# Patient Record
(demographics unavailable — no encounter records)

---

## 2025-01-27 NOTE — HISTORY OF PRESENT ILLNESS
[FreeTextEntry8] : WAS ADMITTED TO [facility] 1/5/2025 TO 1/18/2025 - RSV AND UTI h/o kidney transplant 2011 PCP IN [place] DR PHILAP CHAIN   H/O HTN, HLD, DM, KIDNEY TRANSPLANT  PARKING PERMIT APPLICATION

## 2025-04-08 NOTE — HISTORY OF PRESENT ILLNESS
[FreeTextEntry1] : Ms. Sahni, here with her daughter for post transplant follow up. She was hospitalized with dialysis requiring JUNIOR in January 2025 and recovered now to creatinine of 1.9. She had her DDRT in [facility] and was pre transplant followed by Dr. Angel Lanza. POst transplant she lived in [place] and was followed by her transplant nephrologist Dr. Billingsley and recently moved in with her daughter and family back in [place]. Currently daughter also noted her having memory issues and is having her consult neurologist and establish with a primary care provider in [place]. I reviewed her hospital course, nephrology consult while in hospital and allograft renal biopsy. At present she feels ok, has no acute symptoms except for lingering post herpetic neuralgic pain over left side of chest wall for which she just received delivery of Neurontin 100 mg caosules to be taken twice daily. She has had recent lab work done except for Tacrolimus levels. She is notably on once daily prograf since hospitalization and will check Tacrolimus level pre dose later this week. She ambulates with Rolling walker, can walk short distances and is using wheel chair in the clinic today.   79 years old lady, retired , born in [facility], living in [place] 1962. Developed CKD from DM, was on HD for 3 years followed by Dr. Angel Lanza. received transplant in [facility] ([facility] Billingsley) November 2011. She lived in [place] till 2023. Now moved back to [place] to live with Janee and family.  Had RSV infection and JUNIOR in January she got dialysis through femoral catheter once and then recovered.  Most recent  labs done March 16, 2025: Creatinine 1.9  Hb 8.5  Currently has pain from Zoster lesions on left side of chest wall. Since february. She got antiviral treatment from [place]. Also took Lidocaine patches. Also has Gabapentin just received.  Current Meds: Vitamin D3 q weekly Mycophenolate 250 mg 2 Caps BID Prednisone 5 mg/d Rosuvastatin 40 mg/d Prograf 1 mg once daily (prior to hospitalization was 2 caps am and 1 pm) Losartan 25 mg PM Metoprolol 100 mg once daily am Donepezil 5 mg once daily Novolog premeal sliding scale Tresiba 13 u at night Gabapentin 100 mg 3 times daily  Has seen ophthalmologist. Primary MD: not yet in [place]       Hospital Course: Discharge Date 18-Jan-2025 Admission Date 05-Jan-2025 12:29 Reason for Admission Acute Renal failure, Metabolic Acidosis, Hyperkalemia Hospital Course HPI: 79F PMH ESRD (s/p renal transplant in 2011), DM, HTN presenting for weakness. Daughter reports patient began experiencing weakness in end of November, found to have UTI and treated with bactrim. Patient improved but then developed cough, rhinorrhea, and mild diarrhea in mid December, and tested positive for RSV on 12/19/24. Upon returning from [place] for the holidays in late December, patient developed poor appetite, NBNB emesis, and non-bloody watery diarrhea, as well as progressive weakness and fatigue. Patient endorses new dysuria for past few days Patient denies fevers, abdominal pain, chest pain, shortness of breath, leg swelling. (05 Jan 2025 13:15)  Hospital Course: Patient was admitted for acute renal failure, with placement of fem shiley by vascular surgery on 1/5 for urgent HD on 1/5 and 1/7. For concern for infectious etiologies, patient was placed on ceftriaxone (1/5-1/7) by transplant ID. On 1/8, patient had tunneled permacath and biopsy of transplant kidney revealing acute tubular injury but no signs for rejection. Patient's Cr and electryolytes stabilized after 1/7 HD session. Notably, tacrolimus levels were persistently elevated, which nephro thought can contribute to the kidney injury. Patient was febrile on 1/7 to 101.6 prompting expansion of coverage to cefepime (1/7-1/9) then Zosyn (1/10-1/14), for pseudomonal sputum cultures and fluconazole (1/5-1/10) for candiduria on UA. Notably, speciation resulted with candida glabrata not sensitive to fluconazole, suggesting unlikely contribution to presenting fever. 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 Pt creatinine down trending, permacath removed on 1/17, no further indication for out patient hemodialysis. Tacrolimus and mycophenolate were restarted on 1/19   Important Medication Changes and Reason: continue to take tacrolimus 1mg qd and mycophenolate 500 mg BID continue to take PO acyclovir for HSV skin infection until 1/20   Discharge Diagnoses: - acute renal failure - HSV   Discharge Medications acyclovir 400 mg oral tablet: 1 tab(s) orally 2 times a day Take 2 tabs a day on 1/19 and 1/20 cholecalciferol 1250 mcg (50,000 intl units) oral capsule: 1 cap(s) orally every 7 days on Fridays Crestor 40 mg oral tablet: 1 tab(s) orally once a day (in the morning) donepezil 5 mg oral tablet: 1 tab(s) orally once a day (at bedtime) as needed for dementia donepezil 5 mg oral tablet: 1 tab(s) orally once a day Envarsus XR 1 mg oral tablet, extended release: 1 tab(s) orally once a day famotidine 20 mg oral tablet: 1 tab(s) orally once a day losartan 25 mg oral tablet: 1 tab(s) orally once a day (at bedtime) losartan 25 mg oral tablet: 1 tab(s) orally once a day metoprolol succinate 100 mg oral tablet, extended release: 1 tab(s) orally once a day metoprolol succinate 100 mg oral tablet, extended release: 1 tab(s) orally once a day (in the morning) mycophenolate mofetil 250 mg oral capsule: 2 cap(s) orally 2 times a day mycophenolate mofetil 500 mg oral tablet: 1 tab(s) orally 2 times a day predniSONE 5 mg oral tablet: 1 tab(s) orally once a day (in the morning) predniSONE 5 mg oral tablet: 1 tab(s) orally once a day rosuvastatin 40 mg oral tablet: 1 tab(s) orally sulfamethoxazole-trimethoprim 400 mg-80 mg oral tablet: 2 tab(s) orally 2 times a day tacrolimus 0.5 mg oral capsule: 1 cap(s) orally once a day morning dose tacrolimus 1 mg oral capsule: 1 cap(s) orally once a day (at bedtime) Evening dose Tresiba 100 units/mL subcutaneous solution: 13 international unit(s) subcutaneous once (at bedtime)  , , Care Plan/Procedures: Discharge Diagnoses, Assessment and Plan of Treatment PRINCIPAL DISCHARGE DIAGNOSIS Diagnosis: Acute renal failure (ARF) Assessment and Plan of Treatment: You were treated in the hospital for an acute kidney failure or injury (JUNIOR). An JUNIOR is a sudden worsening of your kidney function that can be caused by many issues including dehydration, intrinsic problems with your kidneys, or blockages in your urinary system. Based on the renal biopsy, it shows signs of acute renal injury but no signs of rejection. This may be secondary to her elevated tacrolimus level vs her penumonia. We decreased your dose of tacrolimus to 1mg daily, please continue taking that and follow up with your kidney doctor out patient. Please also continue taking mycophenolate 500mg twice daily and prednisone 5 mg daily.   SECONDARY DISCHARGE DIAGNOSES Diagnosis: Renal mass Assessment and Plan of Treatment: On the CT of your abdomen and recent renal ultrasound, we identified a renal cyst on your native kidney. Renal cysts are fluid-filled sacs that develop within the kidney. . Most renal cysts do not cause any symptoms and do not require treatment. However, it's important to monitor them to ensure they don't change in size or appearance over time. Please get a repeat ultrasound of your kidneys in three months. This will allow us to compare the size and characteristics of the cyst to the previous ultrasound and ensure that it remains stable. Discharge Procedures, Findings and Treatment PRINCIPAL PROCEDURE Procedure: Biopsy renal Findings and Treatment:   SECONDARY PROCEDURE Procedure: CT of abdomen Findings and Treatment: < end of copied text > PROCEDURE: CT of the Abdomen and Pelvis was performed. Sagittal and coronal reformats were performed. FINDINGS: LOWER CHEST: Cardiomegaly. Coronary artery calcifications. Bibasilar atelectasis. LIVER: Within normal limits. BILE DUCTS: Normal caliber. GALLBLADDER: Cholelithiasis. SPLEEN: Within normal limits. PANCREAS: Within normal limits. ADRENALS: Within normal limits. KIDNEYS/URETERS: Moderately atrophic native kidneys. Indeterminate exophytic lesion in the right interpolar region measuring 2.0 x 1.6 cm. Left renal cyst. No hydronephrosis. Right pelvic transplant kidney within normal limits. No hydronephrosis. BLADDER: Urinary bladder collapsed around a Donovan catheter limiting evaluation. REPRODUCTIVE ORGANS: Uterus and adnexa within normal limits. BOWEL: No bowel obstruction. Appendix is normal. PERITONEUM/RETROPERITONEUM: Within normal limits. VESSELS: Atherosclerotic changes. LYMPH NODES: No lymphadenopathy. ABDOMINAL WALL: Within normal limits. BONES: Degenerative changes. IMPRESSION: Indeterminate exophytic lesion in the right native kidney interpolar region measuring 2.0 x 1.6 cm. recommend nonemergent ultrasound. Normal appearing right pelvic transplant kidney. No hydronephrosis. < from: CT Abdomen and Pelvis No Cont (01.05.25 @ 02:33) >   Procedure: Portable ultrasound of kidney Findings and Treatment: < end of copied text > Renal Transplant: 9.5 cm. No renal mass, hydronephrosis or calculi. Stent noted. Urinary bladder: Donovan catheter. Color and spectral Dopplerreveals homogeneous flow throughout the transplant. Peak iliac artery velocity is 150 cm/sec pre-anastomosis, 149 cm/sec at the anastomosis, and 194 cm/sec post anastomosis. Transplant Renal Artery: Peak systolic velocity is 191 cm/sec anastomosis, 134 cm/sec proximal, 104 cm/sec mid, 63 cm/sec distal and 71 cm/sec hilum. Resistive Indices Range: 0.75-0.82. Prompt upstroke. Transplant Renal Vein: Patent. IMPRESSION: No evidence of a significant renal artery stenosis.< from: US Trans Kidney w/ Doppler, Right (01.05.25 @ 09:04) > < end of copied text > IMPRESSION: Technically limited examination. 1. Atrophic bilateral native kidneys. No hydronephrosis. 2. There is a right mid to lower pole exophytic simple appearing cyst measuring 1.7 x 1.8 x 2.0 cm. No other renal lesion is identified; therefore, this presumably corresponds to the indeterminate abnormality described on the prior CT scan. However, given the technical limitations of this examination, short interval follow-up ultrasound to re-evaluate when the patient is more clinically stable is recommended. There are additional left renal cysts as described above.< from: US Renal (01.07.25 @ 10:44) >  Transplant in patient consult: Dr. Cowan:   HPI: 80 y/o F with Hx of DM, HTN, ESRD on HD for 2-3 yrs and then got DDRT in 2011 in [facility] ( does not remember the name of the hospital) presented with abnormal Scr. Pt is a poor historian and we called the patient's daughter for more history, she did not  the call ( left a voice message). Pt mentioned that she went to [place] for holidays and after she returned, she was sick. Scr was 1.47 last year and in Dec 2024 Scr was 3.3. At the time of admission Scr is 9.89 and the repeat labs showed Scr 10.24. Hyperkalemia with potassium of 5.7. Pt was started on bicarb gtt. Donovan was placed in ED with 250cc UOP. Serum osmolality is high at 315. Calculated osmolality is 305. UA showed blood, yeast like cells and high white count. CT abd and pelvis showed no transplant hydronephrosis. Pt is awake but lethargic and responds to verbal stimuli but is not able to provide accurate history. She mentioned that she takes her medications regularly. Does not remember the names or doses accurately. She denies any chest pain/ abdominal pain/ HA.   Allergies  No Known Allergies  I LABS/STUDIES --------------------------------------------------------------------------------  11.3 5.64 >-----------< 113 [01-04-25 @ 22:05] 36.0  134 | 104 | 96 ----------------------------< 213 [01-05-25 @ 00:17] 5.3 | 11 | 10.24  Ca 8.5 [01-05-25 @ 00:17] Mg 1.9 [01-04-25 @ 22:05]  TPro 6.5 / Alb 3.1 / TBili 0.3 / DBili x / AST 23 / ALT 13 / AlkPhos 37 [01-05-25 @ 00:17]    Serum Osmolality 315 [01-04-25 @ 22:12]  Creatinine Trend: SCr 10.24 [01-05 @ 00:17] SCr 9.89 [01-04 @ 22:05]  Urine Creatinine 126 [01-04-25 @ 22:45] Urine Protein 88 [01-04-25 @ 22:45] Urine Sodium 41 [01-04-25 @ 22:45] Urine Urea Nitrogen 474 [01-04-25 @ 22:45] Urine Potassium 24 [01-04-25 @ 22:45] Urine Osmolality 319 [01-04-25 @ 22:45]   Assessment and Recommendation: - Assessment 80 y/o F with Hx of DM, HTN, ESRD on HD for 2-3 yrs and then got DDRT in 2011 in [facility] ( does not remember the name of the hospital) presented with abnormal Scr. Pt is a poor historian and we called the patient's daughter for more history, she did not  the call ( left a voice message). Pt mentioned that she went to [place] for holidays and after she returned, she was sick. Scr was 1.47 last year and in Dec 2024 Scr was 3.3. At the time of admission Scr is 9.89 and the repeat labs showed Scr 10.24. Hyperkalemia with potassium of 5.7. Pt was started on bicarb gtt. Donovan was placed in ED with 250cc UOP. Serum osmolality is high at 315. Calculated osmolality is 305. UA showed blood, yeast like cells and high white count. CT abd and pelvis showed no transplant hydronephrosis. Pt is awake but lethargic and responds to verbal stimuli but is not able to provide accurate history. She mentioned that she takes her medications regularly. Does not remember the names or doses accurately.  Pt is afebrile at the time of examination. BP stable. Not in volume overload.  JUNIOR on Transplant CKD: Scr baseline unknown. In Dec, 2024, Scr was 3.3. Now 9.89--> 10.24. Acidosis with pH 7.1 and serum bicarb low. On Bicarb gtt. Hyperkalemia with serum potassium of 5.7. Improved with bicarb gtt to 5.3. No volume overload.  Spoke to patient and additional history obtained from daughter who was at bedside. 79 year old woman with HTN, DM, ESRD was on HD x 4 years, received DDRT in 2011 in [facility]. Moved from [place] to [place] last year to live with her daughter, saw a nephrologist Dr. Angel Lanza at [facility] once but no regular follow up. Cr has been ranging ~ 2-2.4mg/dL, most recently up to 3.3 on 12/19/24 in the setting of RSV infection. Pt dx with RSV infection in early December - had URI symptoms and poor appetite, developed intermittent NVD 3 weeks ago, diarrhea improved with imodium but daughter brought her to the ED b/c of persistent vomiting and weakness. Pt has been adherent to her medications MMF 1 gram po bid, Prograf 2/1, Pred 5 Started Ozempic for DM in Nov 2024, also on Novolog For HTN she is on Losartan  S/p DDRT 14 years ago with baseline creatinine 2-2.4mg/dL on IS with MMF, Prograf and Pred JUNIOR with severe metabolic acidosis likely due to ATN in the setting of NVD/poor intake + tacrolimus toxicity, rejection less likely. No significant proteinuria UPCR 0.7. Pyuria and hematuria - check Urine culture Received IV bicarb drip, Donovan in place 200cc UOP, Transplant renal US no hydro unremarkable Will need HD for JUNIOR with hyperkalemia and acidosis  W/u for JUNIOR Send serum PCR for BK, Adenovirus and CMV IR consult for transplant kidney biopsy - can plan for Tuesday/Wednesday once uremia optimized with dialysis  Immunosuppression Hold tacrolimus - level yesterday 16 high, check level in AM. Hold Mycophenolate for now Continue prednisone 5mg daily .  Accession:                             [id number]  Collected Date/Time:                   1/8/2025 18:30 EST Received Date/Time:                    1/8/2025 19:07 EST  Amendment Report - Auth (Verified)  Amendment Diagnoses remain unchanged.  The changes was made in the comment of the clinical information. Comment: The biopsy shows moderate acute tubular injury of tubular dilatation, isometric cytoplasmic vacuolization, simplification and flattening of the tubular epithelial cells.  Acute tubular injury can be seen dehydration and drug toxicity.  The presence of nodular arteriolar hyalinosis, calcineurin inhibitor toxicity is concerned.  Clinical correlation is advised.  There is no evidence of acute T-cell mediated and antibody mediated rejection.  Verified by: Js Duran MD (Electronic Signature) Reported on: 01/13/25 13:58 EST, [facility] [facility] [facility], [address] [place] [address] Phone: [phone]   Fax: [phone] _________________________________________________________________  Surgical Pathology Report - Auth (Verified) Specimen(s) Submitted 1  Right renal biopsy transplant   Final Diagnosis KIDNEY, TRANSPLANT: PERCUTANEOUS NEEDLE CORE BIOPSY -Acute tubular injury, moderate, see comment -Arteriolar hyalinosis, moderate -Glomerulomegaly -Interstitial fibrosis/tubular atrophy, 10% -Global glomerulosclerosis, 6/37  Banff Schema: g0, t0, i0, v0, ptc0, c4d0. cg0, mm1, ct0, ci0, ti0, cv0, ah2.  Comment: The biopsy shows moderate acute tubular injury of tubular dilatation, isometric cytoplasmic vacuolization, simplification and flattening of the tubular epithelial cells.  Acute tubular injury can be seen dehydration and drug toxicity.  The presence of nodular arteriolar hyalinosis, calcineurin inhibitor toxicity is concerned.  Clinical correlation is advised.  In this clinical setting, the patient has elevated cell free DNA,  however, there is no evidence of acute T-cell mediated and antibody mediated rejection.  Dr. Lyndon Gamboa was notified on January 9, 2025 at 4:51 PM via email.  LIGHT MICROSCOPY The renal biopsy is studied at multiple levels of section and stained with H&E, PAS, Trichrome and Raymundo Silver. The biopsy tissue consists of 2 cores of cortex, containing up to 20 glomeruli, 2 of all sampled the message ecological which she will of which she globally sclerotic. Glomeruli show patent capillary lumens with mildly increase in mesangial matrix and cellularity without glomerulitis or fibrin thrombi. Glomerular basement membranes are prominent without double contour. There is estimated at 10% interstitial fibrosis with proportional tubular atrophy associated with minimal interstitial lymphocyte infiltrate and no tubulitis in scarred area.  No interstitial lymphocyte infiltrate nor tubulitis in non-scarred area are seen.  Scattered intratubular calcium oxalate crystals noted.  There is moderate acute tubular injury of the tubular dilatation, simplification, isometrics cytoplasmic vacuolization and flattening in the tubular epithelium.  Arterioles show moderate nodular hyalinosis.  2 unremarkable arteries present for evaluation.  No endotheliitis is identified.  IMMUNOFLUORESCENCE MICROSCOPY Frozen tissues consists of up to 14 glomeruli, 4 of which are globally sclerotic. FITC antisera for IgG, IgA, IgM, C3, C1q, kappa, lambda, fibrinogen, albumin  and C4d are performed.  Casts stain for IgA. Arterioles stain for C3. Remaining stains are essentially negative the glomeruli, tubules, interstitium and vessel wall.  C4d stain is negative in peritubular capillaries.  The positive control stained appropriately.   ELECTRON MICROSCOPE Toluidine blue stains show up to 3 patent glomeruli, 2 of which are cut for ultrastructural evaluation.  Capillary lumens are patent. The glomerular basement membranes are within normal thickness with average thickness of 438 nm (normal 250-450nm).  There is segmental laminal desire internal expansion.  No subepithelial, subendothelial or mesangial electron density deposits identified.  There is estimated at 15% visceral epithelial cell foot process effacement.  Occasional tubular reticular inclusion (TRI) noted.  Endothelial cells are unremarkable. Tubular basement membranes show non-specific ultrastructural changes without deposits.  No lamination on the basement membrane of peritubular capillaries noted. Verified by: Js Duran MD (Electronic Signature) Reported on: 01/13/25 11:02 [facility], [facility] [place] [address] Phone: [phone]   Fax: [phone] _________________________________________________________________

## 2025-04-08 NOTE — PHYSICAL EXAM
[General Appearance - Alert] : alert [General Appearance - In No Acute Distress] : in no acute distress [Sclera] : the sclera and conjunctiva were normal [Outer Ear] : the ears and nose were normal in appearance [Jugular Venous Distention Increased] : there was no jugular-venous distention [Auscultation Breath Sounds / Voice Sounds] : lungs were clear to auscultation bilaterally [Heart Sounds Gallop] : no gallops [Heart Sounds Pericardial Friction Rub] : no pericardial rub [Edema] : there was no peripheral edema [Abdomen Soft] : soft [Abdomen Tenderness] : non-tender [] : no hepato-splenomegaly [Cervical Lymph Nodes Enlarged Posterior Bilaterally] : posterior cervical [Cervical Lymph Nodes Enlarged Anterior Bilaterally] : anterior cervical [Involuntary Movements] : no involuntary movements were seen [___ (cm) Fistula] : [unfilled] (cm) fistula [No Focal Deficits] : no focal deficits [Oriented To Time, Place, And Person] : oriented to person, place, and time [Impaired Insight] : insight and judgment were intact [FreeTextEntry1] : left side lateral chest wall and

## 2025-04-08 NOTE — ASSESSMENT
[FreeTextEntry1] : Renal Transplant recipient (2011, [facility]): Noted allograft function, recent JUNIOR needing dialysis, creatinine at discharge, junaid creatinine. Reviewed for urinary symptoms/fever/chills/pain/new symptoms. Tolerating medications. Lab data from last visit reviewed including allograft function, urinalysis, any viremia and trough level of medication as well as any imaging reports. Immunosuppression: reviewed; Noted maintenance regimen and reviewed target trough level. Noted she is on once daily Prograf (generic) DM: Current regimen reviewed. Optimal target glucose levels reviewed for fasting and post prandial measurements. Will continue to monitor and adjust treatment; reviewed lifestyle modifications for glycemia control. Has flow sheets to maintain home charts of glucose, blood pressure, temperature, weight and urine output. Hypertension: controlled; Reviewed medications.  Reviewed target for blood pressure control. Hyperlipidemia: Reviewed medication regimen/lifestyle modification for maintaining target lipid levels. Cardiovascular risk reduction, primary/secondary prevention measures were discussed as appropriate.   Prophylaxis: Reviewed antimicrobial and GI prophylaxis as well as precautions to prevent infections. Discussed Renal Preservation strategies again including achieving optimal weight through calory restriction and regular exercise, daily exercise, sleep hygiene, maintaining optimized levels of glucose, blood pressure, lipids, avoidance of NSAIDs, Low Na and Low animal protein diet and medication and medical follow up adherence. Advised to bring  medication list at every visit. Discussed ophthalmology and dermatology checks at least yearly and vaccinations. Flu vaccine yearly and Pneumonia vaccine every 5 years. Copy of office visit and lab reports will be sent to primary physician and referring nephrologist/pr transplant team. Will adjust Tacrolimus dose after lab work.

## 2025-04-08 NOTE — REASON FOR VISIT
[Consultation] : a consultation visit [Other: _____] : [unfilled] [FreeTextEntry1] : post transplant follow up consultation

## 2025-06-26 NOTE — PHYSICAL EXAM
[General Appearance - Alert] : alert [General Appearance - In No Acute Distress] : in no acute distress [Sclera] : the sclera and conjunctiva were normal [Outer Ear] : the ears and nose were normal in appearance [Jugular Venous Distention Increased] : there was no jugular-venous distention [Auscultation Breath Sounds / Voice Sounds] : lungs were clear to auscultation bilaterally [Heart Sounds Gallop] : no gallops [Heart Sounds Pericardial Friction Rub] : no pericardial rub [Edema] : there was no peripheral edema [Abdomen Soft] : soft [Abdomen Tenderness] : non-tender [] : no hepato-splenomegaly [Cervical Lymph Nodes Enlarged Posterior Bilaterally] : posterior cervical [Cervical Lymph Nodes Enlarged Anterior Bilaterally] : anterior cervical [Involuntary Movements] : no involuntary movements were seen [___ (cm) Fistula] : [unfilled] (cm) fistula [No Focal Deficits] : no focal deficits [Oriented To Time, Place, And Person] : oriented to person, place, and time [Impaired Insight] : insight and judgment were intact [FreeTextEntry1] : left side lateral chest wall and  Hatchet Flap Text: The defect edges were debeveled with a #15 scalpel blade.  Given the location of the defect, shape of the defect and the proximity to free margins a hatchet flap was deemed most appropriate.  Using a sterile surgical marker, an appropriate hatchet flap was drawn incorporating the defect and placing the expected incisions within the relaxed skin tension lines where possible.    The area thus outlined was incised deep to adipose tissue with a #15 scalpel blade.  The skin margins were undermined to an appropriate distance in all directions utilizing iris scissors.

## 2025-06-26 NOTE — HISTORY OF PRESENT ILLNESS
[FreeTextEntry1] : Ms. Sahni, here with her daughter for post transplant follow up. She was hospitalized with dialysis requiring JUNIOR in January 2025 and recovered now to creatinine of 1.9. She had her DDRT in [facility] and was pre transplant followed by Dr. Angel Lanza.  79 years old lady, retired , born in [facility], living in [place] 1962. Developed CKD from DM, was on HD for 3 years followed by Dr. Angel Lanza. received transplant in [facility] ([facility]) November 2011. She lived in [place] till 2023. Now moved back to [place] to live with Janee and family.  Had RSV infection and JUNIOR in January she got dialysis through femoral catheter once and then recovered.  She is planning to travel to [place] on July 4 weekend for a family get together along with daughter and grand daughter.  Most recent  labs reviewed.   Current Meds: (Updated 6/26/25) Vitamin D3 q weekly Mycophenolate 250 mg 1 Caps BID Prednisone 5 mg/d Prograf 1 mg once daily (prior to hospitalization was 2 caps am and 1 pm)- Increased to twice daily (Level <2) Rosuvastatin 40 mg/d Losartan 25 mg PM Metoprolol 100 mg once daily am Donepezil 5 mg once daily Novolog premeal sliding scale Tresiba 13 u at night Gabapentin 100 mg 3 times daily Valcyte 250 BID (5/30/25)  Has seen ophthalmologist. Primary MD: not yet in [place]       Hospital Course: Discharge Date 18-Jan-2025 Admission Date 05-Jan-2025 12:29 Reason for Admission Acute Renal failure, Metabolic Acidosis, Hyperkalemia Hospital Course HPI: 79F PMH ESRD (s/p renal transplant in 2011), DM, HTN presenting for weakness. Daughter reports patient began experiencing weakness in end of November, found to have UTI and treated with bactrim. Patient improved but then developed cough, rhinorrhea, and mild diarrhea in mid December, and tested positive for RSV on 12/19/24. Upon returning from [place] for the holidays in late December, patient developed poor appetite, NBNB emesis, and non-bloody watery diarrhea, as well as progressive weakness and fatigue. Patient endorses new dysuria for past few days Patient denies fevers, abdominal pain, chest pain, shortness of breath, leg swelling. (05 Jan 2025 13:15)  Hospital Course: Patient was admitted for acute renal failure, with placement of fem shiley by vascular surgery on 1/5 for urgent HD on 1/5 and 1/7. For concern for infectious etiologies, patient was placed on ceftriaxone (1/5-1/7) by transplant ID. On 1/8, patient had tunneled permacath and biopsy of transplant kidney revealing acute tubular injury but no signs for rejection. Patient's Cr and electryolytes stabilized after 1/7 HD session. Notably, tacrolimus levels were persistently elevated, which nephro thought can contribute to the kidney injury. Patient was febrile on 1/7 to 101.6 prompting expansion of coverage to cefepime (1/7-1/9) then Zosyn (1/10-1/14), for pseudomonal sputum cultures and fluconazole (1/5-1/10) for candiduria on UA. Notably, speciation resulted with candida glabrata not sensitive to fluconazole, suggesting unlikely contribution to presenting fever. 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 Pt creatinine down trending, permacath removed on 1/17, no further indication for out patient hemodialysis. Tacrolimus and mycophenolate were restarted on 1/19    Final Diagnosis KIDNEY, TRANSPLANT: PERCUTANEOUS NEEDLE CORE BIOPSY -Acute tubular injury, moderate, see comment -Arteriolar hyalinosis, moderate -Glomerulomegaly -Interstitial fibrosis/tubular atrophy, 10% -Global glomerulosclerosis, 6/37  Banff Schema: g0, t0, i0, v0, ptc0, c4d0. cg0, mm1, ct0, ci0, ti0, cv0, ah2.  Comment: The biopsy shows moderate acute tubular injury of tubular dilatation, isometric cytoplasmic vacuolization, simplification and flattening of the tubular epithelial cells.  Acute tubular injury can be seen dehydration and drug toxicity.  The presence of nodular arteriolar hyalinosis, calcineurin inhibitor toxicity is concerned.  Clinical correlation is advised.  In this clinical setting, the patient has elevated cell free DNA,  however, there is no evidence of acute T-cell mediated and antibody mediated rejection.  Dr. Lyndon Gambao was notified on January 9, 2025 at 4:51 PM via email.  LIGHT MICROSCOPY The renal biopsy is studied at multiple levels of section and stained with H&E, PAS, Trichrome and Raymundo Silver. The biopsy tissue consists of 2 cores of cortex, containing up to 20 glomeruli, 2 of all sampled the message ecological which she will of which she globally sclerotic. Glomeruli show patent capillary lumens with mildly increase in mesangial matrix and cellularity without glomerulitis or fibrin thrombi. Glomerular basement membranes are prominent without double contour. There is estimated at 10% interstitial fibrosis with proportional tubular atrophy associated with minimal interstitial lymphocyte infiltrate and no tubulitis in scarred area.  No interstitial lymphocyte infiltrate nor tubulitis in non-scarred area are seen.  Scattered intratubular calcium oxalate crystals noted.  There is moderate acute tubular injury of the tubular dilatation, simplification, isometrics cytoplasmic vacuolization and flattening in the tubular epithelium.  Arterioles show moderate nodular hyalinosis.  2 unremarkable arteries present for evaluation.  No endotheliitis is identified.  IMMUNOFLUORESCENCE MICROSCOPY Frozen tissues consists of up to 14 glomeruli, 4 of which are globally sclerotic. FITC antisera for IgG, IgA, IgM, C3, C1q, kappa, lambda, fibrinogen, albumin  and C4d are performed.  Casts stain for IgA. Arterioles stain for C3. Remaining stains are essentially negative the glomeruli, tubules, interstitium and vessel wall.  C4d stain is negative in peritubular capillaries.  The positive control stained appropriately.   ELECTRON MICROSCOPE Toluidine blue stains show up to 3 patent glomeruli, 2 of which are cut for ultrastructural evaluation.  Capillary lumens are patent. The glomerular basement membranes are within normal thickness with average thickness of 438 nm (normal 250-450nm).  There is segmental laminal desire internal expansion.  No subepithelial, subendothelial or mesangial electron density deposits identified.  There is estimated at 15% visceral epithelial cell foot process effacement.  Occasional tubular reticular inclusion (TRI) noted.  Endothelial cells are unremarkable. Tubular basement membranes show non-specific ultrastructural changes without deposits.  No lamination on the basement membrane of peritubular capillaries noted. Verified by: Js Duran MD (Electronic Signature) Reported on: 01/13/25 11:02 [facility], [facility] [place] [address] Phone: [phone]   Fax: [phone] _________________________________________________________________

## 2025-06-26 NOTE — ASSESSMENT
[FreeTextEntry1] : Renal Transplant recipient (2011, [facility]): Noted allograft function, recent JUNIOR needing dialysis, creatinine at discharge, junaid creatinine. Reviewed for urinary symptoms/fever/chills/pain/new symptoms. Tolerating medications. Lab data from last visit reviewed including allograft function, urinalysis, any viremia and trough level of medication as well as any imaging reports. Has left UE AVF, clotted, has occassional discomfort, will refer to vascular if persists Immunosuppression: reviewed; Noted maintenance regimen and reviewed target trough level. Noted she is on once daily Prograf (generic)- Increased to 1 mg twice daily, level (<2) Will do labs week of July 7th DM: Current regimen reviewed. Optimal target glucose levels reviewed for fasting and post prandial measurements. Will continue to monitor and adjust treatment; reviewed lifestyle modifications for glycemia control. 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 Discussed eye check/derm check/Gyn Check. Hypertension: controlled; Reviewed medications.  Reviewed target for blood pressure control. Hyperlipidemia: Reviewed medication regimen/lifestyle modification for maintaining target lipid levels. Cardiovascular risk reduction, primary/secondary prevention measures were discussed as appropriate.   Prophylaxis: Reviewed antimicrobial and GI prophylaxis as well as precautions to prevent infections. Discussed Renal Preservation strategies again including achieving optimal weight through calory restriction and regular exercise, daily exercise, sleep hygiene, maintaining optimized levels of glucose, blood pressure, lipids, avoidance of NSAIDs, Low Na and Low animal protein diet and medication and medical follow up adherence. Advised to bring  medication list at every visit. Discussed ophthalmology and dermatology checks at least yearly and vaccinations. Flu vaccine yearly and Pneumonia vaccine every 5 years. Copy of office visit and lab reports will be sent to primary physician and referring nephrologist/pr transplant team. Will adjust Tacrolimus dose after lab work.  F/u in 4 weeks. Labs week of July 7th. No labs done today